# Patient Record
Sex: MALE | Race: AMERICAN INDIAN OR ALASKA NATIVE
[De-identification: names, ages, dates, MRNs, and addresses within clinical notes are randomized per-mention and may not be internally consistent; named-entity substitution may affect disease eponyms.]

---

## 2022-09-29 ENCOUNTER — HOSPITAL ENCOUNTER (EMERGENCY)
Dept: HOSPITAL 5 - ED | Age: 62
LOS: 1 days | Discharge: HOME | End: 2022-09-30
Payer: SELF-PAY

## 2022-09-29 DIAGNOSIS — I10: ICD-10-CM

## 2022-09-29 DIAGNOSIS — F32.9: ICD-10-CM

## 2022-09-29 DIAGNOSIS — Z13.30: Primary | ICD-10-CM

## 2022-09-29 DIAGNOSIS — Z79.899: ICD-10-CM

## 2022-09-29 DIAGNOSIS — G89.29: ICD-10-CM

## 2022-09-29 DIAGNOSIS — F17.200: ICD-10-CM

## 2022-09-29 LAB
BASOPHILS # (AUTO): 0 K/MM3 (ref 0–0.1)
BASOPHILS NFR BLD AUTO: 0.9 % (ref 0–1.8)
BILIRUB UR QL STRIP: (no result)
BLOOD UR QL VISUAL: (no result)
BUN SERPL-MCNC: 13 MG/DL (ref 9–20)
BUN/CREAT SERPL: 16 %
CALCIUM SERPL-MCNC: 8.9 MG/DL (ref 8.4–10.2)
EOSINOPHIL # BLD AUTO: 0.1 K/MM3 (ref 0–0.4)
EOSINOPHIL NFR BLD AUTO: 1.8 % (ref 0–4.3)
HCT VFR BLD CALC: 39.2 % (ref 35.5–45.6)
HEMOLYSIS INDEX: 4
HGB BLD-MCNC: 13 GM/DL (ref 11.8–15.2)
LYMPHOCYTES # BLD AUTO: 1.6 K/MM3 (ref 1.2–5.4)
LYMPHOCYTES NFR BLD AUTO: 47.1 % (ref 13.4–35)
MCHC RBC AUTO-ENTMCNC: 33 % (ref 32–34)
MCV RBC AUTO: 93 FL (ref 84–94)
MONOCYTES # (AUTO): 0.4 K/MM3 (ref 0–0.8)
MONOCYTES % (AUTO): 10.6 % (ref 0–7.3)
MUCOUS THREADS #/AREA URNS HPF: (no result) /HPF
PH UR STRIP: 5 [PH] (ref 5–7)
PLATELET # BLD: 189 K/MM3 (ref 140–440)
PROT UR STRIP-MCNC: (no result) MG/DL
RBC # BLD AUTO: 4.24 M/MM3 (ref 3.65–5.03)
RBC #/AREA URNS HPF: 2 /HPF (ref 0–6)
UROBILINOGEN UR-MCNC: 2 MG/DL (ref ?–2)
WBC #/AREA URNS HPF: < 1 /HPF (ref 0–6)

## 2022-09-29 PROCEDURE — 80048 BASIC METABOLIC PNL TOTAL CA: CPT

## 2022-09-29 PROCEDURE — 85025 COMPLETE CBC W/AUTO DIFF WBC: CPT

## 2022-09-29 PROCEDURE — 81001 URINALYSIS AUTO W/SCOPE: CPT

## 2022-09-29 PROCEDURE — 80320 DRUG SCREEN QUANTALCOHOLS: CPT

## 2022-09-29 PROCEDURE — 36415 COLL VENOUS BLD VENIPUNCTURE: CPT

## 2022-09-29 PROCEDURE — 93005 ELECTROCARDIOGRAM TRACING: CPT

## 2022-09-29 PROCEDURE — G0480 DRUG TEST DEF 1-7 CLASSES: HCPCS

## 2022-09-29 PROCEDURE — 80307 DRUG TEST PRSMV CHEM ANLYZR: CPT

## 2022-09-29 PROCEDURE — 99283 EMERGENCY DEPT VISIT LOW MDM: CPT

## 2022-09-29 NOTE — EVENT NOTE
ED Screening Note


Date of service: 09/29/22


Time: 11:29


ED Screening Note: 








This initial assessment/diagnostic orders/clinical plan/treatment(s) is/are 

subject to change based on patients health status, clinical progression and re-

assessment by fellow clinical providers in the ED. Further treatment and workup 

at subsequent clinical providers discretion. Patient/guardian urged not to elope

from the ED as their condition may be serious if not clinically assessed and 

managed. 





Depressed over mother's recent cancer diagnosis.  Not suicidal but states he 

might harm his roommate who is "driving me batty."  No plan.  


Smiling and joking in triage.  History hospitalization for depression in the 

80s.  





Initial orders include: 


My Active Orders





09/29/22 11:26


EKG (12 lead) Stat 


Mental Health Evaluation ONCE 


Consult to Mental Health [CONS] Urgent 


Acetaminophen Stat 


Basic Metabolic Panel Stat 


Complete Blood Count Auto Diff Stat 


Drugs of Abuse Panel, Urine Stat 


Salicylate Stat 


Urinalysis Complete Stat 





09/29/22 11:27


Blood Alcohol Stat 





09/29/22 11:28


Tech to do EKG .once

## 2022-09-30 VITALS — SYSTOLIC BLOOD PRESSURE: 166 MMHG | DIASTOLIC BLOOD PRESSURE: 96 MMHG

## 2022-09-30 NOTE — EMERGENCY DEPARTMENT REPORT
ED General Adult HPI





- General


Chief complaint: Psych


Stated complaint: Depression


Time Seen by Provider: 22 06:39


Source: patient, RN notes reviewed


Mode of arrival: Ambulatory


Limitations: No Limitations





- History of Present Illness


Initial comments: 





This is a pleasant and cooperative 62-year-old gentleman who presents to the 

department today with a primary complaint of depression.  He is not currently 

homicidal or suicidal.  He reports he is depressed because a family member was 

recently diagnosed with cancer.  He currently denies headache, neck pain, chest 

pain, shortness of breath, vomiting, diarrhea and urinary symptoms.  He is not 

attempted to overdose on anything.  He endorses chronic abdominal cramping, and 

chronic sciatica/radicular pain.





He does report that he has a roommate who is developmentally delayed, and 

sometimes, this roommate annoys him.  However, he states that he would never 

actually physically hurt anybody.





He is currently asking for something to eat or drink.








-: Gradual


Severity scale (0 -10): 2


Consistency: constant


Improves with: none


Worsens with: other (Thinking about family members illness)





- Related Data


                                Home Medications











 Medication  Instructions  Recorded  Confirmed  Last Taken


 


Unobtainable  18 Unknown











                                    Allergies











Allergy/AdvReac Type Severity Reaction Status Date / Time


 


No Known Allergies Allergy   Verified 22 11:32














ED Review of Systems


ROS: 


Stated complaint: DEPRESSION/STOMACH PAIN


Other details as noted in HPI





Constitutional: denies: fever


Eyes: denies: eye discharge


ENT: denies: epistaxis


Respiratory: denies: cough


Cardiovascular: denies: chest pain


Gastrointestinal: denies: vomiting


Genitourinary: denies: dysuria


Musculoskeletal: back pain


Neurological: other (Radicular pain).  denies: weakness


Psychiatric: depression.  denies: auditory hallucinations, visual 

hallucinations, homicidal thoughts, suicidal thoughts





ED Past Medical Hx





- Past Medical History


Hx Hypertension: Yes


Additional medical history: arthritis.  chronic knee pain.  bone spurs





- Social History


Smoking Status: Current Some Day Smoker





- Medications


Home Medications: 


                                Home Medications











 Medication  Instructions  Recorded  Confirmed  Last Taken  Type


 


Unobtainable  18 Unknown History














ED Physical Exam





- General


Limitations: No Limitations


General appearance: alert, in no apparent distress





- Head


Head exam: Present: atraumatic, normocephalic





- Eye


Eye exam: Present: normal appearance, EOMI.  Absent: nystagmus





- ENT


ENT exam: Present: normal exam, normal orophraynx, mucous membranes moist, 

normal external ear exam





- Neck


Neck exam: Present: normal inspection, full ROM.  Absent: tenderness, 

meningismus





- Respiratory


Respiratory exam: Present: normal lung sounds bilaterally.  Absent: respiratory 

distress, wheezes, rales, rhonchi, stridor, decreased breath sounds





- Cardiovascular


Cardiovascular Exam: Present: regular rate, normal rhythm, normal heart sounds. 

Absent: bradycardia, tachycardia, irregular rhythm, systolic murmur, diastolic 

murmur, rubs, gallop





- GI/Abdominal


GI/Abdominal exam: Present: soft, normal bowel sounds.  Absent: distended, 

tenderness, guarding, rebound, rigid, pulsatile mass





- Rectal


Rectal exam: Present: deferred





- Extremities Exam


Extremities exam: Present: normal inspection, full ROM, other (2+ pulses noted 

in the bilateral upper and lower extremities.  There is no palpable cord.   

negative Homans sign.  Muscular compartments are soft.  The pelvis is stable.). 

Absent: pedal edema, calf tenderness





- Back Exam


Back exam: Present: normal inspection.  Absent: tenderness, CVA tenderness (R), 

CVA tenderness (L), paraspinal tenderness, vertebral tenderness





- Neurological Exam


Neurological exam: Present: alert, oriented X3, normal gait, other (No facial 

droop.  Tongue midline.  Extraocular movements intact bilaterally.  Facial 

sensation intact to light touch in V1, V2, V3 distribution bilaterally.  5 and a

5 strength in 4 extremities.  Sensation intact to light touch in 4 

extremities.).  Absent: motor sensory deficit





- Psychiatric


Psychiatric exam: Present: normal affect, normal mood





- Skin


Skin exam: Present: warm, dry, intact, normal color.  Absent: rash





ED Course


                                   Vital Signs











  22





  11:28 21:31 03:54


 


Temperature 98.9 F 98.6 F 


 


Pulse Rate 81 72 84


 


Respiratory 14 18 





Rate   


 


Blood Pressure  167/106 


 


Blood Pressure 146/106  





[Left]   


 


O2 Sat by Pulse 100 99 





Oximetry   














  22





  04:01 04:15 04:31


 


Temperature   


 


Pulse Rate 73 69 72


 


Respiratory 17 16 15





Rate   


 


Blood Pressure 148/98 139/99 148/98


 


Blood Pressure   





[Left]   


 


O2 Sat by Pulse 98 97 98





Oximetry   














  22/22





  04:39 04:40 04:45


 


Temperature   


 


Pulse Rate 88  69


 


Respiratory 18  12





Rate   


 


Blood Pressure   159/109


 


Blood Pressure 148/98  





[Left]   


 


O2 Sat by Pulse 99 98 98





Oximetry   














  22





  05:01 05:15 05:31


 


Temperature   


 


Pulse Rate 65 79 63


 


Respiratory 14 15 14





Rate   


 


Blood Pressure 152/102 145/106 159/97


 


Blood Pressure   





[Left]   


 


O2 Sat by Pulse 97 97 98





Oximetry   














  22





  05:45 06:01 06:15


 


Temperature   


 


Pulse Rate 63 97 H 66


 


Respiratory 16 24 15





Rate   


 


Blood Pressure 166/96 161/97 161/97


 


Blood Pressure   





[Left]   


 


O2 Sat by Pulse 95 95 93





Oximetry   














  22





  06:31 06:45 07:01


 


Temperature   


 


Pulse Rate 65 74 70


 


Respiratory 14 16 15





Rate   


 


Blood Pressure 161/97 166/96 166/96


 


Blood Pressure   





[Left]   


 


O2 Sat by Pulse 95 98 97





Oximetry   














  22





  07:15


 


Temperature 


 


Pulse Rate 66


 


Respiratory 15





Rate 


 


Blood Pressure 166/96


 


Blood Pressure 





[Left] 


 


O2 Sat by Pulse 97





Oximetry 














- Reevaluation(s)


Reevaluation #1: 





22 08:07


Differential diagnosis, including not limited to: Depression, dysthymia, 

radicular pain, GERD, gastritis, hiatal hernia, behavioral health screening 

examination





Assessment and plan: 62-year-old gentleman, who is afebrile, with reassuring 

vital signs, clinically sober, with a GCS of 15, with a primary complaint of 

depression.  He does not meet criteria for 1013 hold or involuntary confinement.

  His abdomen is soft and benign, without rebound, guarding or peritoneal signs.

  When I walked into the room, the patient is watching TV, on his cell phone, 

and in no acute distress.





His laboratory studies are essentially nonactionable.





He was seen by our psychiatric team, and provided outpatient resources.  The 

patient does not appear to have an emergent medical or psychiatric condition 

present at this time.  He has appropriate strength and sensation to his 

bilateral lower extremities, and his abdomen is soft and benign, without 

rebound, guarding or peritoneal signs.  Over-the-counter Tylenol, Pepcid, or 

Protonix for radicular pain and nonspecific abdominal pain.  Diet and lifestyle 

modifications





- Pulse Oximetry Interpretation


  ** Digit-Finger


Initial Pulse Oximetry Readin


O2 Sat by Pulse Oximetry: 100


Actions Taken: none





ED Medical Decision Making





- Lab Data


Result diagrams: 


                                 22 11:38





                                 22 11:38








                                   Vital Signs











  22





  11:28 21:31 03:54


 


Temperature 98.9 F 98.6 F 


 


Pulse Rate 81 72 84


 


Respiratory 14 18 





Rate   


 


Blood Pressure  167/106 


 


Blood Pressure 146/106  





[Left]   


 


O2 Sat by Pulse 100 99 





Oximetry   














  22





  04:01 04:15 04:31


 


Temperature   


 


Pulse Rate 73 69 72


 


Respiratory 17 16 15





Rate   


 


Blood Pressure 148/98 139/99 148/98


 


Blood Pressure   





[Left]   


 


O2 Sat by Pulse 98 97 98





Oximetry   














  22





  04:39 04:40 04:45


 


Temperature   


 


Pulse Rate 88  69


 


Respiratory 18  12





Rate   


 


Blood Pressure   159/109


 


Blood Pressure 148/98  





[Left]   


 


O2 Sat by Pulse 99 98 98





Oximetry   














  22





  05:01 05:15 05:31


 


Temperature   


 


Pulse Rate 65 79 63


 


Respiratory 14 15 14





Rate   


 


Blood Pressure 152/102 145/106 159/97


 


Blood Pressure   





[Left]   


 


O2 Sat by Pulse 97 97 98





Oximetry   














  22





  05:45 06:01 06:15


 


Temperature   


 


Pulse Rate 63 97 H 66


 


Respiratory 16 24 15





Rate   


 


Blood Pressure 166/96 161/97 161/97


 


Blood Pressure   





[Left]   


 


O2 Sat by Pulse 95 95 93





Oximetry   














  22





  06:31 06:45 07:01


 


Temperature   


 


Pulse Rate 65 74 70


 


Respiratory 14 16 15





Rate   


 


Blood Pressure 161/97 166/96 166/96


 


Blood Pressure   





[Left]   


 


O2 Sat by Pulse 95 98 97





Oximetry   














  22





  07:15


 


Temperature 


 


Pulse Rate 66


 


Respiratory 15





Rate 


 


Blood Pressure 166/96


 


Blood Pressure 





[Left] 


 


O2 Sat by Pulse 97





Oximetry 











                                   Lab Results











  22 Range/Units





  11:38 11:38 11:38 


 


WBC  3.4 L    (4.5-11.0)  K/mm3


 


RBC  4.24    (3.65-5.03)  M/mm3


 


Hgb  13.0    (11.8-15.2)  gm/dl


 


Hct  39.2    (35.5-45.6)  %


 


MCV  93    (84-94)  fl


 


MCH  31    (28-32)  pg


 


MCHC  33    (32-34)  %


 


RDW  16.9 H    (13.2-15.2)  %


 


Plt Count  189    (140-440)  K/mm3


 


Lymph % (Auto)  47.1 H    (13.4-35.0)  %


 


Mono % (Auto)  10.6 H    (0.0-7.3)  %


 


Eos % (Auto)  1.8    (0.0-4.3)  %


 


Baso % (Auto)  0.9    (0.0-1.8)  %


 


Lymph # (Auto)  1.6    (1.2-5.4)  K/mm3


 


Mono # (Auto)  0.4    (0.0-0.8)  K/mm3


 


Eos # (Auto)  0.1    (0.0-0.4)  K/mm3


 


Baso # (Auto)  0.0    (0.0-0.1)  K/mm3


 


Seg Neutrophils %  39.6 L    (40.0-70.0)  %


 


Seg Neutrophils #  1.4 L    (1.8-7.7)  K/mm3


 


Sodium   135 L   (137-145)  mmol/L


 


Potassium   3.9   (3.6-5.0)  mmol/L


 


Chloride   97.9 L   ()  mmol/L


 


Carbon Dioxide   23   (22-30)  mmol/L


 


Anion Gap   18   mmol/L


 


BUN   13   (9-20)  mg/dL


 


Creatinine   0.8   (0.8-1.3)  mg/dL


 


Estimated GFR   > 60   ml/min


 


BUN/Creatinine Ratio   16   %


 


Glucose   94   ()  mg/dL


 


Calcium   8.9   (8.4-10.2)  mg/dL


 


Urine Color     (Yellow)  


 


Urine Turbidity     (Clear)  


 


Urine pH     (5.0-7.0)  


 


Ur Specific Gravity     (1.003-1.030)  


 


Urine Protein     (Negative)  mg/dL


 


Urine Glucose (UA)     (Negative)  mg/dL


 


Urine Ketones     (Negative)  mg/dL


 


Urine Blood     (Negative)  


 


Urine Nitrite     (Negative)  


 


Urine Bilirubin     (Negative)  


 


Urine Urobilinogen     (<2.0)  mg/dL


 


Ur Leukocyte Esterase     (Negative)  


 


Urine WBC (Auto)     (0.0-6.0)  /HPF


 


Urine RBC (Auto)     (0.0-6.0)  /HPF


 


U Epithel Cells (Auto)     (0-13.0)  /HPF


 


Urine Mucus     /HPF


 


Salicylates    < 0.3 L  (2.8-20.0)  mg/dL


 


Urine Opiates Screen     


 


Urine Methadone Screen     


 


Acetaminophen     (10.0-30.0)  ug/mL


 


Ur Barbiturates Screen     


 


Ur Phencyclidine Scrn     


 


Ur Amphetamines Screen     


 


U Benzodiazepines Scrn     


 


Urine Cocaine Screen     


 


U Marijuana (THC) Screen     


 


Drugs of Abuse Note     


 


Plasma/Serum Alcohol     (0-0.07)  %














  22 Range/Units





  11:38 11:38 Unknown 


 


WBC     (4.5-11.0)  K/mm3


 


RBC     (3.65-5.03)  M/mm3


 


Hgb     (11.8-15.2)  gm/dl


 


Hct     (35.5-45.6)  %


 


MCV     (84-94)  fl


 


MCH     (28-32)  pg


 


MCHC     (32-34)  %


 


RDW     (13.2-15.2)  %


 


Plt Count     (140-440)  K/mm3


 


Lymph % (Auto)     (13.4-35.0)  %


 


Mono % (Auto)     (0.0-7.3)  %


 


Eos % (Auto)     (0.0-4.3)  %


 


Baso % (Auto)     (0.0-1.8)  %


 


Lymph # (Auto)     (1.2-5.4)  K/mm3


 


Mono # (Auto)     (0.0-0.8)  K/mm3


 


Eos # (Auto)     (0.0-0.4)  K/mm3


 


Baso # (Auto)     (0.0-0.1)  K/mm3


 


Seg Neutrophils %     (40.0-70.0)  %


 


Seg Neutrophils #     (1.8-7.7)  K/mm3


 


Sodium     (137-145)  mmol/L


 


Potassium     (3.6-5.0)  mmol/L


 


Chloride     ()  mmol/L


 


Carbon Dioxide     (22-30)  mmol/L


 


Anion Gap     mmol/L


 


BUN     (9-20)  mg/dL


 


Creatinine     (0.8-1.3)  mg/dL


 


Estimated GFR     ml/min


 


BUN/Creatinine Ratio     %


 


Glucose     ()  mg/dL


 


Calcium     (8.4-10.2)  mg/dL


 


Urine Color    Yellow  (Yellow)  


 


Urine Turbidity    Clear  (Clear)  


 


Urine pH    5.0  (5.0-7.0)  


 


Ur Specific Gravity    1.016  (1.003-1.030)  


 


Urine Protein    <15 mg/dl  (Negative)  mg/dL


 


Urine Glucose (UA)    Neg  (Negative)  mg/dL


 


Urine Ketones    Neg  (Negative)  mg/dL


 


Urine Blood    Sm  (Negative)  


 


Urine Nitrite    Neg  (Negative)  


 


Urine Bilirubin    Neg  (Negative)  


 


Urine Urobilinogen    2.0  (<2.0)  mg/dL


 


Ur Leukocyte Esterase    Neg  (Negative)  


 


Urine WBC (Auto)    < 1.0  (0.0-6.0)  /HPF


 


Urine RBC (Auto)    2.0  (0.0-6.0)  /HPF


 


U Epithel Cells (Auto)    < 1.0  (0-13.0)  /HPF


 


Urine Mucus    Few  /HPF


 


Salicylates     (2.8-20.0)  mg/dL


 


Urine Opiates Screen     


 


Urine Methadone Screen     


 


Acetaminophen  5.0 L    (10.0-30.0)  ug/mL


 


Ur Barbiturates Screen     


 


Ur Phencyclidine Scrn     


 


Ur Amphetamines Screen     


 


U Benzodiazepines Scrn     


 


Urine Cocaine Screen     


 


U Marijuana (THC) Screen     


 


Drugs of Abuse Note     


 


Plasma/Serum Alcohol   < 0.01   (0-0.07)  %














  22 Range/Units





  Unknown 


 


WBC   (4.5-11.0)  K/mm3


 


RBC   (3.65-5.03)  M/mm3


 


Hgb   (11.8-15.2)  gm/dl


 


Hct   (35.5-45.6)  %


 


MCV   (84-94)  fl


 


MCH   (28-32)  pg


 


MCHC   (32-34)  %


 


RDW   (13.2-15.2)  %


 


Plt Count   (140-440)  K/mm3


 


Lymph % (Auto)   (13.4-35.0)  %


 


Mono % (Auto)   (0.0-7.3)  %


 


Eos % (Auto)   (0.0-4.3)  %


 


Baso % (Auto)   (0.0-1.8)  %


 


Lymph # (Auto)   (1.2-5.4)  K/mm3


 


Mono # (Auto)   (0.0-0.8)  K/mm3


 


Eos # (Auto)   (0.0-0.4)  K/mm3


 


Baso # (Auto)   (0.0-0.1)  K/mm3


 


Seg Neutrophils %   (40.0-70.0)  %


 


Seg Neutrophils #   (1.8-7.7)  K/mm3


 


Sodium   (137-145)  mmol/L


 


Potassium   (3.6-5.0)  mmol/L


 


Chloride   ()  mmol/L


 


Carbon Dioxide   (22-30)  mmol/L


 


Anion Gap   mmol/L


 


BUN   (9-20)  mg/dL


 


Creatinine   (0.8-1.3)  mg/dL


 


Estimated GFR   ml/min


 


BUN/Creatinine Ratio   %


 


Glucose   ()  mg/dL


 


Calcium   (8.4-10.2)  mg/dL


 


Urine Color   (Yellow)  


 


Urine Turbidity   (Clear)  


 


Urine pH   (5.0-7.0)  


 


Ur Specific Gravity   (1.003-1.030)  


 


Urine Protein   (Negative)  mg/dL


 


Urine Glucose (UA)   (Negative)  mg/dL


 


Urine Ketones   (Negative)  mg/dL


 


Urine Blood   (Negative)  


 


Urine Nitrite   (Negative)  


 


Urine Bilirubin   (Negative)  


 


Urine Urobilinogen   (<2.0)  mg/dL


 


Ur Leukocyte Esterase   (Negative)  


 


Urine WBC (Auto)   (0.0-6.0)  /HPF


 


Urine RBC (Auto)   (0.0-6.0)  /HPF


 


U Epithel Cells (Auto)   (0-13.0)  /HPF


 


Urine Mucus   /HPF


 


Salicylates   (2.8-20.0)  mg/dL


 


Urine Opiates Screen  Negative  


 


Urine Methadone Screen  Negative  


 


Acetaminophen   (10.0-30.0)  ug/mL


 


Ur Barbiturates Screen  Negative  


 


Ur Phencyclidine Scrn  Negative  


 


Ur Amphetamines Screen  Negative  


 


U Benzodiazepines Scrn  Negative  


 


Urine Cocaine Screen  Negative  


 


U Marijuana (THC) Screen  Negative  


 


Drugs of Abuse Note  Disclamer  


 


Plasma/Serum Alcohol   (0-0.07)  %














- EKG Data


-: EKG Interpreted by Me


EKG shows normal: sinus rhythm


Rate: normal





- EKG Data





22 08:07


The EKG is interpreted at 06: 00 AM





Sinus rhythm, with a rate of 65 bpm.  There is a normal axis, and a normal P 

wave axis.  There is atrial enlargement.  There is left ventricular hypertrophy.

  This is not a STEMI.


Critical care attestation.: 


If time is entered above; I have spent that time in minutes in the direct care 

of this critically ill patient, excluding procedure time.








ED Disposition


Clinical Impression: 


 Depression, Encounter for behavioral health screening





Disposition:  HOME / SELF CARE / HOMELESS


Is pt being admited?: No


Does the pt Need Aspirin: No


Condition: Good


Additional Instructions: 


Please follow-up with an outpatient mental health specialist within the next 

week.





Avoid consumption of alcohol, tobacco, smoke products and recreational drugs.





Avoid consumption of Motrin, ibuprofen, Naprosyn, Aleve, heavy and spicy foods.





Alternate ice packs and heat packs as needed for physical pain, and may take 

over-the-counter Tylenol, Pepcid, Protonix as needed for physical pain.





Follow-up with a primary care doctor within the next 2 weeks.














Please return to the emergency room right away with new pain, worsened pain, 

migration of pain, projectile vomiting, change in mental status, confusion, 

inability tolerate liquid feeds, new, worsened or different symptoms not present

 on the initial emergency room evaluation 








professional and Agency Contacts To help Resolve Crises ()


[place] Crisis Line: 1-193.781.2582


Suicide Prevention Line: 1-155.192.6605


Crisis Text Line: Text ``START to 977241


Emergency: 911





Outpatient [facility] Resources:


[place]:


[facility]


[address] [place] [address]


Phone: [phone] 





[place]: [facility]


[address] [place] [address] 


Phone: [phone]


Monday thru Friday - 8am - 5pm


**Call to schedule an assessment for mental health and substance abuse 

programs***





[facility]


Address: [address] [place] [address]


Monday thru Friday- 7am-2pm


Phone: [phone]





[facility]


Address: [address] [place] [address]


Monday thru Friday: 8:30AM-5PM


Phone: [phone]























Professional and Agency Contacts To help Resolve Crises ()


[place] Crisis Line: 2-110-038-9395


Suicide Prevention Line: 1-810.322.1938


Crisis Text Line: Text START to 781806


Emergency: 911





[facility] Resources:


[place]:


[facility]


[address] [place], [place] [address]


Phone: [phone] 





[place]: 


[facility]  [facility]


[address] [place] [address] 


Phone: [phone]


Monday thru Friday - 8am - 5pm


**Call to schedule an assessment for mental health and substance abuse 

programs***





[facility]


Address: [address] [place] [address]


Monday thru Friday- 7am-2pm


Phone: [phone]





[facility]


Address: [address] [place] [address]


Monday thru Friday: 8:30AM-5PM


Phone: [phone]














Referrals: 


[facility] [Outside] - 3-5 Days


[facility] [Outside] - 3-5 Days


[facility] [Provider Group] - 3-5 Days


Forms:  Work/School Release Form(ED)
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
## 2022-09-30 NOTE — ELECTROCARDIOGRAPH REPORT
[facility]

                                       

Test Date:    2022               Test Time:    05:24:03

Pat Name:     SHARMILA TRINH            Department:   

Patient ID:   [id number]          Room:          

Gender:       M                        Technician:   

:          [id number]               Requested By: SHASHI SERRANO

Order Number: [id number]             Reading MD:   Paresh Gaviria

                                 Measurements

Intervals                              Axis          

Rate:         65                       P:            59

MT:           158                      QRS:          18

QRSD:         84                       T:            -2

QT:           436                                    

QTc:          456                                    

                           Interpretive Statements

Sinus rhythm

Probable left atrial enlargement

No previous ECG available for comparison

Electronically Signed On 2022 8:33:54 PDT by Paresh Gaviria